# Patient Record
Sex: FEMALE | Race: WHITE | URBAN - METROPOLITAN AREA
[De-identification: names, ages, dates, MRNs, and addresses within clinical notes are randomized per-mention and may not be internally consistent; named-entity substitution may affect disease eponyms.]

---

## 2017-03-31 ENCOUNTER — COMMUNICATION - RIVER FALLS (OUTPATIENT)
Dept: FAMILY MEDICINE | Facility: CLINIC | Age: 22
End: 2017-03-31

## 2017-03-31 ENCOUNTER — OFFICE VISIT - RIVER FALLS (OUTPATIENT)
Dept: FAMILY MEDICINE | Facility: CLINIC | Age: 22
End: 2017-03-31

## 2017-03-31 ASSESSMENT — MIFFLIN-ST. JEOR: SCORE: 1158.29

## 2018-02-09 ENCOUNTER — TRANSFERRED RECORDS (OUTPATIENT)
Dept: HEALTH INFORMATION MANAGEMENT | Facility: CLINIC | Age: 23
End: 2018-02-09

## 2018-02-14 ENCOUNTER — TELEPHONE (OUTPATIENT)
Dept: OPHTHALMOLOGY | Facility: CLINIC | Age: 23
End: 2018-02-14

## 2018-02-14 NOTE — TELEPHONE ENCOUNTER
Pt one three tripletts  One other sister previous had glaucoma surgery with dr. Ellis years ago    Mother was told all 3     College student in [place]  Having trouble placing drops in eyes-- mild cerebral palsy    Pt currently scheduled with dr. macdonald in march  Referred for glaucoma laser surgery-- notes to be coming to clinic  Pt/family comfortable seeing dr. Macdonald    Would like to know if able to have laser procedure same day  Reviewed would send message to support staff to review records and decide if able to do same day  Pt/mother  aware may be next week for feedback    Felix Maynard RN 10:27 AM 02/14/18

## 2018-02-19 ENCOUNTER — HEALTH MAINTENANCE LETTER (OUTPATIENT)
Age: 23
End: 2018-02-19

## 2018-03-14 DIAGNOSIS — H40.9 GLAUCOMA: Primary | ICD-10-CM

## 2018-03-15 ENCOUNTER — OFFICE VISIT (OUTPATIENT)
Dept: OPHTHALMOLOGY | Facility: CLINIC | Age: 23
End: 2018-03-15
Attending: OPHTHALMOLOGY
Payer: COMMERCIAL

## 2018-03-15 DIAGNOSIS — H40.9 GLAUCOMA: ICD-10-CM

## 2018-03-15 DIAGNOSIS — H40.033 ANATOMICAL NARROW ANGLE BORDERLINE GLAUCOMA OF BOTH EYES: Primary | ICD-10-CM

## 2018-03-15 PROCEDURE — 92020 GONIOSCOPY: CPT | Mod: ZF | Performed by: OPHTHALMOLOGY

## 2018-03-15 PROCEDURE — 92132 CPTRZD OPH DX IMG ANT SGM: CPT | Mod: ZF | Performed by: OPHTHALMOLOGY

## 2018-03-15 PROCEDURE — 66761 REVISION OF IRIS: CPT | Mod: ZF | Performed by: OPHTHALMOLOGY

## 2018-03-15 PROCEDURE — G0463 HOSPITAL OUTPT CLINIC VISIT: HCPCS | Mod: ZF

## 2018-03-15 PROCEDURE — 92083 EXTENDED VISUAL FIELD XM: CPT | Mod: ZF | Performed by: OPHTHALMOLOGY

## 2018-03-15 RX ORDER — MONTELUKAST SODIUM 10 MG/1
10 TABLET ORAL DAILY
COMMUNITY
Start: 2017-04-11

## 2018-03-15 RX ORDER — DEXTROAMPHETAMINE SACCHARATE, AMPHETAMINE ASPARTATE MONOHYDRATE, DEXTROAMPHETAMINE SULFATE AND AMPHETAMINE SULFATE 6.25; 6.25; 6.25; 6.25 MG/1; MG/1; MG/1; MG/1
25 CAPSULE, EXTENDED RELEASE ORAL DAILY
COMMUNITY
Start: 2017-06-28

## 2018-03-15 RX ORDER — SPIRONOLACTONE 50 MG/1
50 TABLET, FILM COATED ORAL DAILY
COMMUNITY
Start: 2017-03-17

## 2018-03-15 RX ORDER — LORATADINE 10 MG/1
10 TABLET ORAL DAILY
COMMUNITY
Start: 2016-12-16

## 2018-03-15 ASSESSMENT — VISUAL ACUITY
METHOD: SNELLEN - LINEAR
OS_CC: 20/50
OS_CC+: -2
CORRECTION_TYPE: GLASSES
OD_CC: 20/30
OD_PH_CC: 20/30+2

## 2018-03-15 ASSESSMENT — REFRACTION_WEARINGRX
SPECS_TYPE: SVL
OD_CYLINDER: +5.25
OS_AXIS: 104
OD_AXIS: 100
OD_SPHERE: -15.50
OS_SPHERE: -16.50
OS_CYLINDER: +3.00

## 2018-03-15 ASSESSMENT — TONOMETRY
OS_IOP_MMHG: 11
OS_IOP_MMHG: 10
IOP_METHOD: TONOPEN
OD_IOP_MMHG: 07
IOP_METHOD: TONOPEN
OD_IOP_MMHG: 08

## 2018-03-15 ASSESSMENT — PACHYMETRY
OS_CT(UM): 596
OD_CT(UM): 582

## 2018-03-15 ASSESSMENT — CONF VISUAL FIELD
OD_SUPERIOR_NASAL_RESTRICTION: 3
OS_INFERIOR_NASAL_RESTRICTION: 3
OS_INFERIOR_TEMPORAL_RESTRICTION: 3
OS_SUPERIOR_TEMPORAL_RESTRICTION: 3
OS_SUPERIOR_NASAL_RESTRICTION: 3

## 2018-03-15 ASSESSMENT — EXTERNAL EXAM - RIGHT EYE: OD_EXAM: NORMAL

## 2018-03-15 ASSESSMENT — GONIOSCOPY
OS_SUPERIOR: CLOSED
OD_INFERIOR: MINIMAL PIGMENT

## 2018-03-15 ASSESSMENT — SLIT LAMP EXAM - LIDS
COMMENTS: NORMAL
COMMENTS: NORMAL

## 2018-03-15 ASSESSMENT — EXTERNAL EXAM - LEFT EYE: OS_EXAM: NORMAL

## 2018-03-15 NOTE — PATIENT INSTRUCTIONS
Travatan both eyes at bedtime   Simbrinza both eyes twice a day  - stop 2 days prior to next appointment

## 2018-03-15 NOTE — NURSING NOTE
Chief Complaints and History of Present Illnesses   Patient presents with     Glaucoma Evaluation     mixed mechanism glaucoma     HPI    Affected eye(s):  Both   Symptoms:     No decreased vision   No floaters   No flashes      Duration:  5 weeks   Frequency:  Constant       Do you have eye pain now?:  No      Comments:  Pt here for eval of possible glaucoma surgery  Pt is a triplet - her sister (Massiel) had glaucoma surgery in 2008  Pt was seen by Dr. García at [facility] a little over a month ago - no changes in vision noted    Ocular meds:  Travatan QD, both eyes  Simbrinza BID, both eyes    Sammi Mercado COA 8:30 AM March 15, 2018

## 2018-03-15 NOTE — PROGRESS NOTES
CC: Here for another opinion.  Has seen Dr Keane, Dr García     HPI: One of triplets, premature 23 weeks.  All with low vision.  Eye pain started about a year ago left eye, worse around the beginning of 2018, off and on.  Diagnosed with Juvenile Open Angle Glaucoma that may have been exacerbated by a course of oral prednisone for a cold in February.  She saw Dr García who noted that she had elevated eye pressures, who suggested medical treatment versus goniotomy. Has some redness/irritation to eye drops.    PAST OCULAR HISTORY:  Strabismus surgery as a child  Retinal lasers as infant to prevent retinal detachment    High myopia    MAXIMUM INTRAOCULAR PRESSURE:  30/40    MEDICATIONS:  Travatan both eyes at bedtime   Simbrinza both eyes twice a day    No allergies to sulfa.    PMH:  Asthma (has been on multiple doses of steroids in the past)  No history of kidney stones    FAMILY/SOCIAL HISTORY:        One sister (identical) has glaucoma (diagnosed and had surgery in 2008) treated with laser peripheral iridotomy (LPI) both eyes (Ellis)  All triplets have low vision (2 identical, 1 fraternal)  Final semester at [employer]    TESTING TODAY:  [facility] Octopus visual field    Right eye: good reliability, paracentral scotoma and enlarged blind spot   Left eye: fair reliability, global depression, paracentral scotoma    OCT retinal nerve fiber layer - unable, nystagmus prevents any meaningful tracking.     Anterior segment OCT shows narrow and appositional areas both eyes     EXAM:  Gonio shows narrow angles with areas of apposition-can push open, difficult view left eye with corneal haze  Favorable corneal thickness     IMPRESSION:  Intermittent angle closure left eye > right eye    Symptoms for the past year   Worse in the last couple of months  High myopia  Doubt few day course of steroids for a cold had any effect    PLAN:  Improvement in intraocular pressure with travatan, simbrinza  Laser peripheral iridotomy (LPI) done  both eyes today   9 o'clock right eye (could not see superior iris at laser)   12 o'clock left eye   Continue Travatan/Simbrinza until next appointment, stop Simbrinza 2 days before next visit  May need touch up of periperal iridotomy's since very difficult to do      Guru Mejia MD  PGY3     Attending Physician Attestation:  Complete documentation of historical and exam elements from today's encounter can be found in the full encounter summary report (not reduplicated in this progress note). I personally obtained the chief complaint(s) and history of present illness. I confirmed and edited asnecessary the review of systems, past medical/surgical history, family history, social history, and examination findings as documented by others; and I examined the patient myself. I personally reviewed the relevant tests, images, and reports as documented above. I formulated and edited as necessary the assessment and plan and discussed the findings and management plan with the patient and family.  - Cindy Ellis MD 7:57 PM 3/15/2018

## 2018-03-15 NOTE — MR AVS SNAPSHOT
After Visit Summary   3/15/2018    Katy Neal    MRN: [id number]           Patient Information     Date Of Birth          1995        Visit Information        Provider Department      3/15/2018 8:00 AM Cindy [facility] MD [facility]        Today's Diagnoses     Anatomical narrow angle borderline glaucoma of both eyes    -  1    Glaucoma          Care Instructions    Travatan both eyes at bedtime   Simbrinza both eyes twice a day  - stop 2 days prior to next appointment          Follow-ups after your visit        Your next 10 appointments already scheduled     2018 10:15 AM CDT   RETURN GLAUCOMA with Cindy Ellis MD   Eye Clinic ([facility])    [address]  [address]  [facility] [address] [phone]              Who to contact     Please call your clinic at [phone] to:    Ask questions about your health    Make or cancel appointments    Discuss your medicines    Learn about your test results    Speak to your doctor            Additional Information About Your Visit        MyChart Information     [employer] is an electronic gateway that provides easy, online access to your medical records. With [employer], you can request a clinic appointment, read your test results, renew a prescription or communicate with your care team.     To sign up for [employer] visit the website at [email or web address]   You will be asked to enter the access code listed below, as well as some personal information. Please follow the directions to create your username and password.     Your access code is: [email or web address]  Expires: 2018  7:30 AM     Your access code will  in 90 days. If you need help or a new code, please contact your [facility] or call [phone] for assistance.        Care EveryWhere ID     This is your Care EveryWhere ID. This could be used by other organizations to access your [place]  medical records  [id number]         Blood Pressure from Last 3 Encounters:   No data found for BP    Weight from Last 3 Encounters:   No data found for Wt              We Performed the Following     Low Vision Central OU     OCT Anterior Segment Spectralis OU (both eyes)     OCT Optic Nerve RNFL Spectralis OU (both eyes)     Pachymetry OU (both eyes)        Primary Care Provider Office Phone # Fax #    Monet Chong [phone]       [facility] [address] [place] [address]        Equal Access to Services     LEONOR MONTOYA : Hadii aad ku hadasho Soomaali, waaxda luqadaha, qaybta kaalmada adeegyada, waxay idiin hayaan adeeg kharaeverardo chase . So [facility] [phone].    ATENCIÓN: Si habla español, tiene a freedman disposición servicios gratuitos de asistencia lingüística. [facility] [phone].    We comply with applicable federal civil rights laws and [place] laws. We do not discriminate on the basis of race, color, national origin, age, disability, sex, sexual orientation, or gender identity.            Thank you!     Thank you for choosing EYE CLINIC  for your care. Our goal is always to provide you with excellent care. Hearing back from our patients is one way we can continue to improve our services. Please take a few minutes to complete the written survey that you may receive in the mail after your visit with us. Thank you!             Your Updated Medication List - Protect others around you: Learn how to safely use, store and throw away your medicines at www.disposemymeds.org.          This list is accurate as of 3/15/18  1:06 PM.  Always use your most recent med list.                   Brand Name Dispense Instructions for use Diagnosis    amphetamine-dextroamphetamine 25 MG 24 hr capsule    ADDERALL XR     Take 25 mg by mouth daily        FLUoxetine 20 MG capsule    PROzac     Take 20 mg by mouth daily        loratadine 10 MG tablet    CLARITIN     Take 10 mg by mouth daily         montelukast 10 MG tablet    SINGULAIR     Take 10 mg by mouth daily        SIMBRINZA OP      Apply 1 drop to eye 2 times daily Both eyes        spironolactone 50 MG tablet    ALDACTONE     Take 50 mg by mouth daily        TRAVATAN OP      Apply 1 drop to eye daily Both eyes

## 2018-03-16 DIAGNOSIS — H40.033 ANATOMICAL NARROW ANGLE BORDERLINE GLAUCOMA OF BOTH EYES: Primary | ICD-10-CM

## 2018-04-19 ENCOUNTER — OFFICE VISIT (OUTPATIENT)
Dept: OPHTHALMOLOGY | Facility: CLINIC | Age: 23
End: 2018-04-19
Attending: OPHTHALMOLOGY
Payer: COMMERCIAL

## 2018-04-19 DIAGNOSIS — H40.033 ANATOMICAL NARROW ANGLE BORDERLINE GLAUCOMA OF BOTH EYES: Primary | ICD-10-CM

## 2018-04-19 PROCEDURE — G0463 HOSPITAL OUTPT CLINIC VISIT: HCPCS | Mod: ZF

## 2018-04-19 ASSESSMENT — REFRACTION_WEARINGRX
OD_AXIS: 100
OS_AXIS: 104
OD_CYLINDER: +5.25
OS_SPHERE: -16.50
OS_CYLINDER: +3.00
OD_SPHERE: -15.50
SPECS_TYPE: SVL

## 2018-04-19 ASSESSMENT — TONOMETRY
OS_IOP_MMHG: 36
IOP_METHOD: APPLANATION
OD_IOP_MMHG: 15
IOP_METHOD: TONOPEN
OS_IOP_MMHG: 34
OD_IOP_MMHG: 11

## 2018-04-19 ASSESSMENT — CONF VISUAL FIELD
OS_INFERIOR_NASAL_RESTRICTION: 3
OS_SUPERIOR_TEMPORAL_RESTRICTION: 3
OD_NORMAL: 1
OS_INFERIOR_TEMPORAL_RESTRICTION: 3
OS_SUPERIOR_NASAL_RESTRICTION: 3

## 2018-04-19 ASSESSMENT — EXTERNAL EXAM - LEFT EYE: OS_EXAM: NORMAL

## 2018-04-19 ASSESSMENT — EXTERNAL EXAM - RIGHT EYE: OD_EXAM: NORMAL

## 2018-04-19 ASSESSMENT — VISUAL ACUITY
OS_PH_CC: 20/60-1
OS_CC: 20/70
OD_PH_CC: 20/25-3
OD_CC: 20/30
CORRECTION_TYPE: GLASSES
METHOD: SNELLEN - LINEAR

## 2018-04-19 ASSESSMENT — SLIT LAMP EXAM - LIDS
COMMENTS: NORMAL
COMMENTS: NORMAL

## 2018-04-19 NOTE — NURSING NOTE
"Chief Complaints and History of Present Illnesses   Patient presents with     Follow Up For     Anatomical narrow angle borderline glaucoma of both eyes - LPI of both eyes 3/15/18; IOP check     HPI    Affected eye(s):  Both   Symptoms:     No floaters   No flashes      Duration:  1 month   Frequency:  Constant       Do you have eye pain now?:  No      Comments:  Pt here for 1 month f/u bilateral LPI - IOP check  Pt reports no changes in vision since last visit  Pt notes that when she walks outside between classes for more than a couple minutes, her left eye gets a \"whitish film\" over it and she can't see from the left eye (this goes away after she is back inside)  Pt did note a slight pressure/pain in the left eye only, but this has resolved 3 weeks ago    Ocular meds:  Travatan QD, both eyes  Simbrinza BID, both eyes - was stopped two days ago    Sammi SCHULER 10:45 AM April 19, 2018              "

## 2018-04-19 NOTE — PROGRESS NOTES
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  All with low vision.  Eye pain started about a year ago left eye, worse around the beginning of 2018, off and on.  Diagnosed with Juvenile Open Angle Glaucoma that may have been exacerbated by a course of oral prednisone for a cold in February.  She saw Dr García who noted that she had elevated eye pressures, who suggested medical treatment versus goniotomy. Has some redness/irritation to eye drops.    PAST OCULAR HISTORY:  Strabismus surgery as a child  Retinal lasers as infant to prevent retinal detachment    High myopia    MAXIMUM INTRAOCULAR PRESSURE:  30/40    MEDICATIONS:  Travatan both eyes at bedtime   Simbrinza both eyes twice a day  - stopped 2 days ago as instructed  No allergies to sulfa.    PMH:  Asthma (has been on multiple doses of steroids in the past)  No history of kidney stones    FAMILY/SOCIAL HISTORY:        One sister (identical) has glaucoma (diagnosed and had surgery in 2008) treated with laser peripheral iridotomy (LPI) both eyes (Ellis)  All triplets have low vision (2 identical, 1 fraternal)  Final semester at [employer]    TESTING 3/15/18:  [facility] Octopus visual field    Right eye: good reliability, paracentral scotoma and enlarged blind spot   Left eye: fair reliability, global depression, paracentral scotoma    OCT retinal nerve fiber layer - unable, nystagmus prevents any meaningful tracking.     Anterior segment OCT shows narrow and appositional areas both eyes     EXAM:  Gonio shows narrow angles with areas of apposition-can push open, difficult view left eye with corneal haze  Favorable corneal thickness     IMPRESSION:  Intermittent angle closure left eye > right eye    Symptoms for the past year   Worse in the last couple of months  High myopia  Doubt few day course of steroids for a cold had any effect    PLAN:  Intraocular pressure OK right eye on just Travatan without Simbrinza  Intraocular pressure left eye  too high without Simbrinza so restart  Laser peripheral iridotomy (LPI) enhanced both eyes today ( right eye #9 at 3 mJ, left eye #3 at 3 mJ)   9 o'clock right eye (could not see superior iris at laser)   12 o'clock left eye   Return to clinic 2 months intraocular pressure check, check periperal iridotomies, and repeat Ant Seg OCT    Attending Physician Attestation:  Complete documentation of historical and exam elements from today's encounter can be found in the full encounter summary report (not reduplicated in this progress note). I personally obtained the chief complaint(s) and history of present illness. I confirmed and edited asnecessary the review of systems, past medical/surgical history, family history, social history, and examination findings as documented by others; and I examined the patient myself. I personally reviewed the relevant tests, images, and reports as documented above. I formulated and edited as necessary the assessment and plan and discussed the findings and management plan with the patient and family.  - Cindy Ellis MD 10:53 AM 4/19/2018

## 2018-04-19 NOTE — MR AVS SNAPSHOT
After Visit Summary   2018    Katy Neal    MRN: [id number]           Patient Information     Date Of Birth          1995        Visit Information        Provider Department      2018 10:15 AM Cindy [facility] MD [facility]        Today's Diagnoses     Anatomical narrow angle borderline glaucoma of both eyes    -  1       Follow-ups after your visit        Follow-up notes from your care team     Return in about 2 months (around 2018) for iop check, ant seg OCT.      Who to contact     Please call your clinic at [phone] to:    Ask questions about your health    Make or cancel appointments    Discuss your medicines    Learn about your test results    Speak to your doctor            Additional Information About Your Visit        MyChart Information     [employer] is an electronic gateway that provides easy, online access to your medical records. With [employer], you can request a clinic appointment, read your test results, renew a prescription or communicate with your care team.     To sign up for [employer] visit the website at [email or web address]   You will be asked to enter the access code listed below, as well as some personal information. Please follow the directions to create your username and password.     Your access code is: [email or web address]  Expires: 2018  7:30 AM     Your access code will  in 90 days. If you need help or a new code, please contact your [facility] or call [phone] for assistance.        Care EveryWhere ID     This is your Care EveryWhere ID. This could be used by other organizations to access your [place] medical records  [id number]         Blood Pressure from Last 3 Encounters:   No data found for BP    Weight from Last 3 Encounters:   No data found for Wt              Today, you had the following     No orders found for display         Where to get your medicines      These medications were sent to [id number] - [place], [place] - [address] [facility]  [address] [place] [address]     Phone:  [phone]     brinzolamide-brimonidine 1-0.2 % ophthalmic suspension    Travoprost 0.004 % Soln          Primary Care Provider Office Phone # Fax #    Monet Chong [phone]       [facility] [address]   [facility] [address]        Equal Access to Services     LEONOR MONTOYA : Hadii aad ku hadasho Soomaali, waaxda luqadaha, qaybta kaalmada adeegyada, waxay idiin hayaan adeeg kharash la'melquiades subramanian. So [facility] [phone].    ATENCIÓN: Si habla español, tiene a freedman disposición servicios gratuitos de asistencia lingüística. [facility] [phone].    We comply with applicable federal civil rights laws and [place] laws. We do not discriminate on the basis of race, color, national origin, age, disability, sex, sexual orientation, or gender identity.            Thank you!     Thank you for choosing EYE CLINIC  for your care. Our goal is always to provide you with excellent care. Hearing back from our patients is one way we can continue to improve our services. Please take a few minutes to complete the written survey that you may receive in the mail after your visit with us. Thank you!             Your Updated Medication List - Protect others around you: Learn how to safely use, store and throw away your medicines at www.disposemymeds.org.          This list is accurate as of 4/19/18 11:49 AM.  Always use your most recent med list.                   Brand Name Dispense Instructions for use Diagnosis    amphetamine-dextroamphetamine 25 MG 24 hr capsule    ADDERALL XR     Take 25 mg by mouth daily        brinzolamide-brimonidine 1-0.2 % ophthalmic suspension    SIMBRINZA    1 Bottle    Place 1 drop into both eyes 2 times daily Both eyes    Anatomical narrow angle borderline glaucoma of both eyes       FLUoxetine 20 MG capsule    PROzac     Take 20 mg by mouth daily         loratadine 10 MG tablet    CLARITIN     Take 10 mg by mouth daily        montelukast 10 MG tablet    SINGULAIR     Take 10 mg by mouth daily        spironolactone 50 MG tablet    ALDACTONE     Take 50 mg by mouth daily        Travoprost 0.004 % Soln     1 Bottle    Apply 1 drop to eye daily Both eyes    Anatomical narrow angle borderline glaucoma of both eyes

## 2018-06-01 ENCOUNTER — OFFICE VISIT (OUTPATIENT)
Dept: OPHTHALMOLOGY | Facility: CLINIC | Age: 23
End: 2018-06-01
Attending: OPHTHALMOLOGY
Payer: COMMERCIAL

## 2018-06-01 DIAGNOSIS — H40.9 GLAUCOMA: Primary | ICD-10-CM

## 2018-06-01 DIAGNOSIS — H40.033 ANATOMICAL NARROW ANGLE BORDERLINE GLAUCOMA OF BOTH EYES: ICD-10-CM

## 2018-06-01 PROCEDURE — 92132 CPTRZD OPH DX IMG ANT SGM: CPT | Mod: ZF | Performed by: OPHTHALMOLOGY

## 2018-06-01 PROCEDURE — G0463 HOSPITAL OUTPT CLINIC VISIT: HCPCS | Mod: ZF

## 2018-06-01 ASSESSMENT — VISUAL ACUITY
OS_CC+: -1
METHOD: SNELLEN - LINEAR
OS_CC: 20/60
OD_PH_CC: 20/30+3
CORRECTION_TYPE: GLASSES
OD_CC: 20/30

## 2018-06-01 ASSESSMENT — CONF VISUAL FIELD
OS_INFERIOR_NASAL_RESTRICTION: 3
OD_NORMAL: 1

## 2018-06-01 ASSESSMENT — EXTERNAL EXAM - LEFT EYE: OS_EXAM: NORMAL

## 2018-06-01 ASSESSMENT — TONOMETRY
IOP_METHOD: APPLANATION
OS_IOP_MMHG: 12
OD_IOP_MMHG: 12

## 2018-06-01 ASSESSMENT — SLIT LAMP EXAM - LIDS
COMMENTS: NORMAL
COMMENTS: NORMAL

## 2018-06-01 ASSESSMENT — EXTERNAL EXAM - RIGHT EYE: OD_EXAM: NORMAL

## 2018-06-01 NOTE — MR AVS SNAPSHOT
After Visit Summary   6/1/2018    Katy Neal    MRN: [id number]           Patient Information     Date Of Birth          1995        Visit Information        Provider Department      6/1/2018 9:45 AM Cindy Ellis MD [facility]        Today's Diagnoses     Glaucoma    -  1    Anatomical narrow angle borderline glaucoma of both eyes           Follow-ups after your visit        Follow-up notes from your care team     Return in about 6 months (around 12/1/2018) for visual field LVC.      Who to contact     Please call your clinic at [phone] to:    Ask questions about your health    Make or cancel appointments    Discuss your medicines    Learn about your test results    Speak to your doctor            Additional Information About Your Visit        Care EveryWhere ID     This is your Care EveryWhere ID. This could be used by other organizations to access your [place] medical records  [id number]         Blood Pressure from Last 3 Encounters:   No data found for BP    Weight from Last 3 Encounters:   No data found for Wt              We Performed the Following     OCT Anterior Segment Spectralis OU (both eyes)        Primary Care Provider Office Phone # Fax #    Monet Chong [phone]       [facility] [address] [place] [address]        Equal Access to Services     [facility]: Hadii aad ku hadasho Soomaali, waaxda luqadaha, qaybta kaalmada adeegyada, linda chase . So [facility] [phone].    ATENCIÓN: Si habla español, tiene a freedman disposición servicios gratuitos de asistencia lingüística. Llame al [phone].    We comply with applicable federal civil rights laws and [place] laws. We do not discriminate on the basis of race, color, national origin, age, disability, sex, sexual orientation, or gender identity.            Thank you!     Thank you for choosing EYE CLINIC  for your care. Our goal is always to  provide you with excellent care. Hearing back from our patients is one way we can continue to improve our services. Please take a few minutes to complete the written survey that you may receive in the mail after your visit with us. Thank you!             Your Updated Medication List - Protect others around you: Learn how to safely use, store and throw away your medicines at www.disposemymeds.org.          This list is accurate as of 6/1/18 12:30 PM.  Always use your most recent med list.                   Brand Name Dispense Instructions for use Diagnosis    amphetamine-dextroamphetamine 25 MG 24 hr capsule    ADDERALL XR     Take 25 mg by mouth daily        brinzolamide-brimonidine 1-0.2 % ophthalmic suspension    SIMBRINZA    1 Bottle    Place 1 drop into both eyes 2 times daily Both eyes    Anatomical narrow angle borderline glaucoma of both eyes       FLUoxetine 20 MG capsule    PROzac     Take 20 mg by mouth daily        loratadine 10 MG tablet    CLARITIN     Take 10 mg by mouth daily        montelukast 10 MG tablet    SINGULAIR     Take 10 mg by mouth daily        spironolactone 50 MG tablet    ALDACTONE     Take 50 mg by mouth daily        Travoprost 0.004 % Soln     1 Bottle    Apply 1 drop to eye daily Both eyes    Anatomical narrow angle borderline glaucoma of both eyes

## 2018-06-01 NOTE — PROGRESS NOTES
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  All with low vision.  Eye pain started about a year ago left eye, worse around the beginning of 2018, off and on.  Diagnosed with Juvenile Open Angle Glaucoma that may have been exacerbated by a course of oral prednisone for a cold in February.  She saw Dr García who noted that she had elevated eye pressures, who suggested medical treatment versus goniotomy. Has some redness/irritation to eye drops.    PAST OCULAR HISTORY:  Strabismus surgery as a child  Retinal lasers as infant to prevent retinal detachment    High myopia    MAXIMUM INTRAOCULAR PRESSURE:  30/40    MEDICATIONS:   stopped 6/1/18  Stopped 6/1/18  No allergies to sulfa.    PMH:  Asthma (has been on multiple doses of steroids in the past)  No history of kidney stones    FAMILY/SOCIAL HISTORY:        One sister (identical) has glaucoma (diagnosed and had surgery in 2008) treated with laser peripheral iridotomy (LPI) both eyes (Ellis)  All triplets have low vision (2 identical, 1 fraternal)  Graduated May 2018 [employer]    TESTING 6/1/18:  OCT of anterior segment    Right eye narrow but open   Left eye widely open    EXAM:  Favorable corneal thickness     IMPRESSION:  Intermittent angle closure left eye > right eye    Symptoms for the past year   Worse in the last couple of months  High myopia  Doubt few day course of steroids for a cold had any effect    PLAN:  Intraocular pressure good both eyes so stop drops left eye   Laser peripheral iridotomy (LPI) enhanced both eyes today ( right eye #9 at 3 mJ, left eye #3 at 3 mJ)   9 o'clock right eye (could not see superior iris at laser)   12 o'clock left eye     Will get intraocular pressure checked locally in about 2 months, if OK Return to clinic 6 months intraocular pressure check and LVC both eyes   She will send note through [facility] with intraocular pressure numbers    Attending Physician Attestation:  Complete documentation of  historical and exam elements from today's encounter can be found in the full encounter summary report (not reduplicated in this progress note). I personally obtained the chief complaint(s) and history of present illness. I confirmed and edited asnecessary the review of systems, past medical/surgical history, family history, social history, and examination findings as documented by others; and I examined the patient myself. I personally reviewed the relevant tests, images, and reports as documented above. I formulated and edited as necessary the assessment and plan and discussed the findings and management plan with the patient and family.  - Cindy Ellis MD 10:53 AM 4/19/2018

## 2018-06-01 NOTE — NURSING NOTE
Chief Complaints and History of Present Illnesses   Patient presents with     Follow Up For     1+ month follow up Anatomical narrow angle borderline glaucoma of both eyes     HPI    Affected eye(s):  Both   Symptoms:     No floaters   No flashes   No glare   No halos         Do you have eye pain now?:  No      Comments:  anatomical narrow angle borderline glaucoma of both eyes  Tony [place]uk GANGA 11:04 AM June 1, 2018

## 2018-12-03 ENCOUNTER — OFFICE VISIT (OUTPATIENT)
Dept: OPHTHALMOLOGY | Facility: CLINIC | Age: 23
End: 2018-12-03
Attending: OPHTHALMOLOGY
Payer: COMMERCIAL

## 2018-12-03 DIAGNOSIS — H40.039 ANATOMICAL NARROW ANGLE BORDERLINE GLAUCOMA: Primary | ICD-10-CM

## 2018-12-03 PROCEDURE — 92083 EXTENDED VISUAL FIELD XM: CPT | Mod: ZF | Performed by: OPHTHALMOLOGY

## 2018-12-03 PROCEDURE — G0463 HOSPITAL OUTPT CLINIC VISIT: HCPCS | Mod: ZF

## 2018-12-03 ASSESSMENT — EXTERNAL EXAM - LEFT EYE: OS_EXAM: NORMAL

## 2018-12-03 ASSESSMENT — REFRACTION_WEARINGRX
OD_CYLINDER: +5.25
OS_AXIS: 104
OS_CYLINDER: +3.00
OS_SPHERE: -16.50
SPECS_TYPE: SVL
OD_AXIS: 100
OD_SPHERE: -15.50

## 2018-12-03 ASSESSMENT — VISUAL ACUITY
CORRECTION_TYPE: GLASSES
OS_CC: 20/60
METHOD: SNELLEN - LINEAR
OS_CC+: -2
OD_PH_CC: 20/30+2
OD_CC: 20/30

## 2018-12-03 ASSESSMENT — TONOMETRY
IOP_METHOD: APPLANATION
OD_IOP_MMHG: 15
OS_IOP_MMHG: 14

## 2018-12-03 ASSESSMENT — SLIT LAMP EXAM - LIDS
COMMENTS: NORMAL
COMMENTS: NORMAL

## 2018-12-03 ASSESSMENT — EXTERNAL EXAM - RIGHT EYE: OD_EXAM: NORMAL

## 2018-12-03 NOTE — PROGRESS NOTES
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  All with low vision.  Eye pain started about a year ago left eye, worse around the beginning of 2018, off and on.  Diagnosed with Juvenile Open Angle Glaucoma that may have been exacerbated by a course of oral prednisone for a cold in February.  She saw Dr García who noted that she had elevated eye pressures, who suggested medical treatment versus goniotomy. Has some redness/irritation to eye drops.    PAST OCULAR HISTORY:  Strabismus surgery as a child  Retinal lasers as infant to prevent retinal detachment    High myopia    MAXIMUM INTRAOCULAR PRESSURE:  30/40    MEDICATIONS:   stopped 6/1/18  Stopped 6/1/18  No allergies to sulfa.    PMH:  Asthma (has been on multiple doses of steroids in the past)  No history of kidney stones    FAMILY/SOCIAL HISTORY:        One sister (identical) has glaucoma (diagnosed and had surgery in 2008) treated with laser peripheral iridotomy (LPI) both eyes (Ellis)  All triplets have low vision (2 identical, 1 fraternal)  Graduated May 2018 [employer]    TESTING   12/3/18  Octopus visual field LVC   Right eye normal   Left eye with scatter and decreased MD, stable    6/1/18:  OCT of anterior segment    Right eye narrow but open   Left eye widely open    EXAM:  Laser peripheral iridotomy (LPI) patent both eyes     IMPRESSION:  Intermittent angle closure left eye > right eye    No episodes since periperal iridotomies done  High myopia  Doubt few day course of steroids for a cold had any effect    PLAN:  Intraocular pressure good both eyes so stop drops left eye   Laser peripheral iridotomy (LPI) patent both eyes     Return to clinic 6 months intraocular pressure check and LVC both eyes   Working for [employer], serves afternoon tea on weekends      Attending Physician Attestation:  Complete documentation of historical and exam elements from today's encounter can be found in the full encounter summary report (not  reduplicated in this progress note). I personally obtained the chief complaint(s) and history of present illness. I confirmed and edited asnecessary the review of systems, past medical/surgical history, family history, social history, and examination findings as documented by others; and I examined the patient myself. I personally reviewed the relevant tests, images, and reports as documented above. I formulated and edited as necessary the assessment and plan and discussed the findings and management plan with the patient and family.  - Cindy Ellis MD 11:15 AM 12/3/2018

## 2018-12-03 NOTE — NURSING NOTE
Chief Complaints and History of Present Illnesses   Patient presents with     Follow Up For     6 month follow up Intermittent angle closure left eye > right eye      HPI    Affected eye(s):  Both   Symptoms:     No floaters   No flashes   No redness   No Dryness   No itching         Do you have eye pain now?:  No      Comments:  Pt states vision is the same as last visit. No eye pain today.    Karina BECKER December 3, 2018 10:06 AM

## 2018-12-03 NOTE — MR AVS SNAPSHOT
After Visit Summary   12/3/2018    Katy Neal    MRN: [id number]           Patient Information     Date Of Birth          1995        Visit Information        Provider Department      12/3/2018 9:45 AM C[facility] MD [facility]        Today's Diagnoses     Anatomical narrow angle borderline glaucoma    -  1       Follow-ups after your visit        Follow-up notes from your care team     Return in about 6 months (around 6/3/2019) for visual field LVC.      Your next 10 appointments already scheduled     2019 10:15 AM CDT   RETURN GLAUCOMA with Cindy Ellis MD   Eye Clinic ([facility])    [address]  9th Fl Clin [address] [place] [id number]   [phone]              Who to contact     Please call your clinic at [phone] to:    Ask questions about your health    Make or cancel appointments    Discuss your medicines    Learn about your test results    Speak to your doctor            Additional Information About Your Visit        MyChart Information     [employer] is an electronic gateway that provides easy, online access to your medical records. With [employer] ([employer]), you can request a clinic appointment, read your test results, renew a prescription or communicate with your care team.     To sign up for [employer] visit the website at [email or web address]   You will be asked to enter the access code listed below, as well as some personal information. Please follow the directions to create your username and password.     Your access code is: [email or web address]  Expires: 3/3/2019 11:22 AM     Your access code will  in 90 days. If you need help or a new code, please contact your [facility] or call [phone] for assistance.        Care EveryWhere ID     This is your Care EveryWhere ID. This could be used by other organizations to access your [facility] medical records  [id number]         Blood Pressure  from Last 3 Encounters:   No data found for BP    Weight from Last 3 Encounters:   No data found for Wt              We Performed the Following     [facility]        Primary Care Provider Office Phone # Fax #    Monet Chong [phone]       [facility] [address] [facility] [address]        Equal Access to Services     LEONOR MONTOYA : Hadii aad ku hadasho Soomaali, waaxda luqadaha, qaybta kaalmada adeegyada, waxay idiin hayaan adeeg khpopeyesh laaydenn . So [facility] [phone].    ATENCIÓN: Si habla español, tiene a freedman disposición servicios gratuitos de asistencia lingüística. [facility] [phone].    We comply with applicable federal civil rights laws and [place] laws. We do not discriminate on the basis of race, color, national origin, age, disability, sex, sexual orientation, or gender identity.            Thank you!     Thank you for choosing EYE CLINIC  for your care. Our goal is always to provide you with excellent care. Hearing back from our patients is one way we can continue to improve our services. Please take a few minutes to complete the written survey that you may receive in the mail after your visit with us. Thank you!             Your Updated Medication List - Protect others around you: Learn how to safely use, store and throw away your medicines at www.disposemymeds.org.          This list is accurate as of 12/3/18 11:30 AM.  Always use your most recent med list.                   Brand Name Dispense Instructions for use Diagnosis    amphetamine-dextroamphetamine 25 MG 24 hr capsule    ADDERALL XR     Take 25 mg by mouth daily        FLUoxetine 20 MG capsule    PROzac     Take 20 mg by mouth daily        loratadine 10 MG tablet    CLARITIN     Take 10 mg by mouth daily        montelukast 10 MG tablet    SINGULAIR     Take 10 mg by mouth daily        spironolactone 50 MG tablet    ALDACTONE     Take 50 mg by mouth daily

## 2019-09-30 ENCOUNTER — OFFICE VISIT (OUTPATIENT)
Dept: OPHTHALMOLOGY | Facility: CLINIC | Age: 24
End: 2019-09-30
Attending: OPHTHALMOLOGY
Payer: COMMERCIAL

## 2019-09-30 DIAGNOSIS — H40.033 ANATOMICAL NARROW ANGLE BORDERLINE GLAUCOMA OF BOTH EYES: Primary | ICD-10-CM

## 2019-09-30 PROCEDURE — G0463 HOSPITAL OUTPT CLINIC VISIT: HCPCS | Mod: ZF

## 2019-09-30 ASSESSMENT — TONOMETRY
OS_IOP_MMHG: 21
IOP_METHOD: APPLANATION
OD_IOP_MMHG: 13

## 2019-09-30 ASSESSMENT — REFRACTION_WEARINGRX
OS_SPHERE: -16.50
SPECS_TYPE: SVL
OD_CYLINDER: +5.25
OD_AXIS: 100
OD_SPHERE: -15.50
OS_AXIS: 104
OS_CYLINDER: +3.00

## 2019-09-30 ASSESSMENT — VISUAL ACUITY
OS_CC: 20/60
OD_PH_CC+: +2
METHOD: SNELLEN - LINEAR
OD_CC+: -2
OD_PH_CC: 20/40
OS_CC+: -1
OD_CC: 20/40

## 2019-09-30 ASSESSMENT — CONF VISUAL FIELD
METHOD: COUNTING FINGERS
OD_NORMAL: 1
OS_INFERIOR_TEMPORAL_RESTRICTION: 3

## 2019-09-30 ASSESSMENT — EXTERNAL EXAM - RIGHT EYE: OD_EXAM: NORMAL

## 2019-09-30 ASSESSMENT — SLIT LAMP EXAM - LIDS
COMMENTS: NORMAL
COMMENTS: NORMAL

## 2019-09-30 ASSESSMENT — EXTERNAL EXAM - LEFT EYE: OS_EXAM: NORMAL

## 2019-09-30 NOTE — PROGRESS NOTES
CC: Here to check intraocular pressure     HPI: One of triplets, premature 23 weeks.  All with low vision.  Eye pain started about a year ago left eye, worse around the beginning of 2018, off and on.  Diagnosed with Juvenile Open Angle Glaucoma that may have been exacerbated by a course of oral prednisone for a cold in February.  She saw Dr García who noted that she had elevated eye pressures, who suggested medical treatment versus goniotomy. Has some redness/irritation to eye drops.    PAST OCULAR HISTORY:  Strabismus surgery as a child  Retinal lasers as infant to prevent retinal detachment    High myopia    MAXIMUM INTRAOCULAR PRESSURE:  30/40    MEDICATIONS:   stopped 6/1/18  Stopped 6/1/18  No allergies to sulfa.    PMH:  Asthma (has been on multiple doses of steroids in the past)  No history of kidney stones    FAMILY/SOCIAL HISTORY:        One sister (identical) has glaucoma (diagnosed and had surgery in 2008) treated with laser peripheral iridotomy (LPI) both eyes (Ellis)  All triplets have low vision (2 identical, 1 fraternal)  Graduated May 2018 [employer]    TESTING   12/3/18  Octopus visual field LVC   Right eye normal   Left eye with scatter and decreased MD, stable    6/1/18:  OCT of anterior segment    Right eye narrow but open   Left eye widely open    EXAM:  Laser peripheral iridotomy (LPI) patent both eyes     IMPRESSION:  Intermittent angle closure left eye > right eye    No episodes since periperal iridotomies done  High myopia  Doubt few day course of steroids for a cold had any effect    PLAN:  Intraocular pressure good both eyes so stop drops left eye   Laser peripheral iridotomy (LPI) patent both eyes     Return to clinic 4 months intraocular pressure check and LVC both eyes (sick today, did not do visual field)  Working for [employer])    Attending Physician Attestation:  Complete documentation of historical and exam elements from today's encounter can be found in the full encounter  summary report (not reduplicated in this progress note). I personally obtained the chief complaint(s) and history of present illness. I confirmed and edited asnecessary the review of systems, past medical/surgical history, family history, social history, and examination findings as documented by others; and I examined the patient myself. I personally reviewed the relevant tests, images, and reports as documented above. I formulated and edited as necessary the assessment and plan and discussed the findings and management plan with the patient and family.  - Cindy Ellis MD 11:20 AM 9/30/2019

## 2022-02-11 VITALS
SYSTOLIC BLOOD PRESSURE: 88 MMHG | DIASTOLIC BLOOD PRESSURE: 54 MMHG | HEIGHT: 61 IN | HEART RATE: 72 BPM | OXYGEN SATURATION: 98 % | TEMPERATURE: 99 F | WEIGHT: 108 LBS | RESPIRATION RATE: 16 BRPM | BODY MASS INDEX: 20.39 KG/M2

## 2022-02-16 NOTE — PROGRESS NOTES
Patient:   CECILIO CHARLES            MRN: [id number]            FIN: [id number]               Age:   22 years     Sex:  Female     :  1995   Associated Diagnoses:   Pharyngitis   Author:   Rodrigo Amaya PA-C      Chief Complaint   3/31/2017 3:51 PM CDT    New pt here for sore throat and dry cough x 4 days.      History of Present Illness   Chief complaint and symptoms noted above and confirmed with patient       Review of Systems   Constitutional:  No fever.    Ear/Nose/Mouth/Throat:  Sore throat, No nasal congestion.    Respiratory:  Cough, No sputum production.    Gastrointestinal:  Negative.       Health Status   Allergies:    Allergic Reactions (Selected)  Severity Not Documented  Demerol HCl (Vomiting)  Penicillin (Vomiting)   Problem list:    All Problems  Resolved: H/O: asthma / SNOMED CT 257049969  Resolved: Premature birth / SNOMED CT [id number]   Medications:  (Selected)   Documented Medications  Documented  Adderall XR 25 mg oral capsule, extended release: 1 cap(s) ( 25 mg ), PO, qAM, # 30 cap(s), 0 Refill(s), Type: Maintenance  Advair Diskus 100 mcg-50 mcg inhalation powder: 1 puff(s), inh, bid, 0 Refill(s), Type: Maintenance  FLUoxetine 20 mg oral tablet: 1 tab(s) ( 20 mg ), PO, Daily, # 30 tab(s), 0 Refill(s), Type: Maintenance  Singulair 10 mg oral tablet: 1 tab(s) ( 10 mg ), po, daily, 0 Refill(s), Type: Maintenance  Vitamin C: daily, 0 Refill(s), Type: Maintenance  albuterol 1.25 mg/3 mL (0.042%) inhalation solution: 3 mL ( 1.25 mg ), neb, tid, 0 Refill(s), Type: Maintenance  calcium (as carbonate) 600 mg oral tablet: 2 tab(s) ( 1,200 mg ), po, daily, 0 Refill(s), Type: Maintenance  loratadine 10 mg oral capsule: 1 cap(s) ( 10 mg ), po, daily, 0 Refill(s), Type: Maintenance  spironolactone: po, 0 Refill(s), Type: Maintenance      Histories   Past Medical History:    Resolved  Premature birth ([id number]):  Resolved.  H/O: asthma  (194939515):  Resolved.   Family History:    No family history items have been selected or recorded.   Procedure history:    Extraction of wisdom tooth ([id number]).  H/O umbilical hernia repair ([id number]).   Social History:        Tobacco Assessment            Never smoker        Physical Examination   Vital Signs   3/31/2017 3:51 PM CDT Temperature Tympanic 99 DegF    Peripheral Pulse Rate 72 bpm    Pulse Site Radial artery    Respiratory Rate 16 br/min    Systolic Blood Pressure 88 mmHg  LOW    Diastolic Blood Pressure 54 mmHg  LOW    Mean Arterial Pressure 65 mmHg    BP Site Right arm    Oxygen Saturation 98 %      Measurements from flowsheet : Measurements   3/31/2017 3:51 PM CDT Height Measured - Standard 60.75 in    Weight Measured - Standard 108 lb    BSA 1.45 m2    Body Mass Index 20.57 kg/m2      General:  No acute distress.    HENT:  Tympanic membranes are clear, No sinus tenderness, nares are patent, oropharynx is moderately enlarged and inflamed without exudate.    Neck:  Supple, Non-tender, mild anterior cervical lymphadenopathy.    Respiratory:  Lungs are clear to auscultation.       Review / Management   Results review:       Interpretation: rapid strep is negative; culture pending, will call if abnormal results..       Impression and Plan   Diagnosis     Pharyngitis (TBF82-[id number] J02.9).     Summary:  Fluids, salt water gargles, popsicles,  throat lozenges, NSAIDS; follow up if not improving.    Orders     Orders   Charges (Evaluation and Management):  30545 office outpatient new 20 minutes (Charge) (Order): Quantity: 1, Pharyngitis.

## 2022-03-26 ENCOUNTER — HEALTH MAINTENANCE LETTER (OUTPATIENT)
Age: 27
End: 2022-03-26

## 2022-09-18 ENCOUNTER — HEALTH MAINTENANCE LETTER (OUTPATIENT)
Age: 27
End: 2022-09-18

## 2023-05-06 ENCOUNTER — HEALTH MAINTENANCE LETTER (OUTPATIENT)
Age: 28
End: 2023-05-06